# Patient Record
(demographics unavailable — no encounter records)

---

## 2024-10-15 NOTE — ASSESSMENT
[FreeTextEntry1] : 65 year old female with low back pain and lumbar radicular pain secondary to stenosis.  Given the nature of the patient's complaints and lack of significant improvement following more conservative measures, we did discuss lumbar epidural steroid injection.  Risks, benefits, and expectations of the procedure were reviewed including but not limited to bleeding, infection, and nerve damage.  The patient was provided with an educational pamphlet outlining the details of the procedure so that he/she may have the ability to review the information prior to proceeding.  The patient has agreed to proceed and will follow up with me for the procedure.

## 2024-10-15 NOTE — PHYSICAL EXAM
[Normal] : Deep tendon reflexes were 2+ and symmetric, the sensory exam was normal to light touch and pinprick and no focal deficits [de-identified] : tenderness to palpation over right PSIS [Straight-Leg Raise Test - Left] : straight leg raise of the left leg was negative [Straight-Leg Raise Test - Right] : straight leg raise  of the right leg was negative [Able to toe walk] : the patient was able to toe walk [Able to heel walk] : the patient was able to heel walk

## 2024-10-15 NOTE — DATA REVIEWED
[MRI] : MRI [FreeTextEntry1] : EXAM: [id number] - MR SPINE LUMBAR  - ORDERED BY: TEOFILO PORTER   PROCEDURE DATE:  08/12/2024    INTERPRETATION:  CLINICAL INFORMATION: Back pain  ADDITIONAL CLINICAL INFORMATION: Not Applicable  TECHNIQUE: Multiplanar, multisequence MRI was performed of the lumbar spine.  PRIOR STUDIES: Lumbar spine MRI dated 6/25/2024  FINDINGS:  BONES: Modic type I changes at the L1-2 and L2-3 level. Modic type II changes at the L2-3 and L3-4 levels. No fracture is noted. ALIGNMENT: A moderate levocurvature is noted with apex at L3. Mild right lateral listhesis of L2 on L3. Mild anterolisthesis of L4 on L5. Mild retrolisthesis of L1 on L2 and L2 on L3 and L3 on L4 SACROILIAC JOINTS/SACRUM: Mild sclerosis and spurring of both lower sacroiliac joints. CONUS AND CAUDA EQUINA: The distal cord and conus are normal in signal. Conus terminates at L2. VISUALIZED INTRAPELVIC/INTRA-ABDOMINAL SOFT TISSUES: Left renal cyst PARASPINAL SOFT TISSUES: Symmetric appearance of paraspinal musculature demonstrating mild to moderate atrophy   INDIVIDUAL LEVELS:  T10/T11: Evaluated only in the sagittal plane. Left paracentral disc protrusion mildly contacts the ventral left-sided spinal cord. Mild central canal narrowing. Moderate left foraminal narrowing. T11/T12: Evaluated only in the sagittal plane. Osseous ridging with a broad-based central disc protrusion. Mild to moderate bilateral foraminal narrowing. Mild central canal narrowing. T12/L1: Right lateral recess/foraminal disc protrusion.  Mild to moderate disc bulging. Mild facet degenerative change. Mild to moderate right lateral recess narrowing. Moderate left lateral recess narrowing. Mild bilateral foraminal narrowing. No central canal narrowing. L1-L2: Retrolisthesis. Uncovering the disc space with central disc protrusion. Posterior osseous ridging. Moderate central canal stenosis with mild contact of the distal conus. Moderate bilateral foraminal narrowing is worse on the left. L2-L3: Moderate posterior osseous ridging and disc bulging. Moderate to severe central canal narrowing. Moderate right and mild to moderate left foraminal narrowing. L3-L4: Moderate posterior osseous ridging effaces the ventral thecal sac. Moderate facet and moderate to severe ligamentous hypertrophy. Mild to moderate bilateral foraminal narrowing with contact of exiting bilateral nerve roots. Moderate central canal narrowing. L4-L5: Mild anterolisthesis uncovering the disc. Moderate to severe facet arthrosis. Moderate right and mild left foraminal narrowing. Mild bilateral lateral recess narrowing. No central canal narrowing. L5-S1: Osseous ridging. Left lateral recess disc extrusion extending posteriorly compressing the descending left S1 nerve root. Moderate to severe bilateral foraminal narrowing. No central canal narrowing.   IMPRESSION:  1.  Severe multilevel spondylosis as detailed above. 2.  Moderate to severe canal stenosis at the level of the conus including L1-2 and L2-3  --- End of Report ---       JACQUE MIRAMONTES MD; Attending Radiologist This document has been electronically signed. Aug 19 2024  9:55PM        EXAM: [id number] - MR SPINE THORACIC WAW IC  - ORDERED BY: TEOFILO PORTER   PROCEDURE DATE:  08/12/2024    INTERPRETATION:  CLINICAL INFORMATION: Palpable lesion.  COMPARISON: MRI lumbar spine 6/25/2024. Thoracic spine MRI 1/10/2007  CONTRAST: IV Contrast: Gadavist  9.5 cc administered  0.5 cc discarded Complications: None reported at time of study completion  TECHNIQUE: MR of the Thoracic Spine.  FINDINGS:  LOCALIZER: Signal abnormality is noted within the right frontal and parietal lobes.  OSSEOUS STRUCTURES: No fracture. Modic type I changes at L1-L2.  INTERVERTEBRAL DISCS: Multilevel disc desiccation and disc space narrowing.  ALIGNMENT: Slight retrolisthesis of L1 on L2, unchanged compared with the prior exam.  THORACIC CORD: Cord is of normal caliber and signal characteristics. No abnormal enhancement.  EXTRASPINAL: Partially imaged thyroid nodules are noted. Partially imaged left renal cyst. No skin markers are noted. Along the dorsal subcutaneous tissues, there is a partially imaged increased prominence of the subcutaneous fat at the level of C6-T3 with suggestion of partially imaged encapsulated fat intensity lesion within this region compared with 2007.  No nodular or mass-like enhancement is noted in the imaged portion.  INDIVIDUAL LEVELS:  T1-T2: No central canal or foraminal narrowing. Findings are similar compared with the prior exam. T2-T3: Mild disc bulge, impinging the ventral thecal sac. No canal stenosis or neural foraminal narrowing. Findings are similar compared with the prior exam. T3-T4: No central canal or foraminal narrowing. Findings are similar compared to prior exam. T4-T5: Central disc protrusion, impinging the left anterior lateral thecal sac. No canal stenosis or neural foraminal narrowing. Findings are similar compared with the prior exam. T5-T6: Left paracentral disc protrusion, impinging the left anterior lateral thecal sac. No canal stenosis or neural foraminal narrowing. Findings are similar compared with the prior exam. T6-T7: Left paracentral disc protrusion, impinging the left anterior lateral thecal sac. No canal stenosis or neural foraminal narrowing. Findings are similar compared with the prior exam. T7-T8: Right paracentral disc protrusion, impinging the right anterolateral thecal sac. No canal stenosis or neural foraminal narrowing. Findings are similar compared with the prior exam. T8-T9: Left paracentral disc protrusion, abutting the left anterior lateral cord and causing mild left neural foraminal narrowing. Findings are similar compared with the prior exam. T9-T10: Bilateral paracentral disc protrusion, impinging the right and left anterior lateral thecal sac . No neural foraminal narrowing. Findings are similar compared with the prior exam. T10-T11: Left paracentral disc protrusion, contacting the left anterior cord and causing mild left neural foraminal narrowing. Findings are similar compared with the prior exam. T11-T12: Asymmetric disc bulge to the right, effacing the right lateral ventricle CSF space. Moderate facet arthropathy. Effacing the ventral CSF space and moderate bilateral neural foraminal narrowing. Extent of canal stenosis and neural foraminal narrowing has progressed since 2007. T12-L1: Asymmetric disc bulge to the right without canal stenosis or neural foraminal narrowing. Findings are similar compared with prior exam.  L1-L2: Only evaluated on the sagittal images. Retrolisthesis. Uncovering of the disc with superimposed central disc protrusion, posterior osseous ridging, causing moderate canal stenosis and contact of the ventral conus. Severe left and moderate right neural foraminal narrowing. Findings are better visualized on concurrent lumbar spine MRI. L2-L3: Only evaluated on the sagittal images. Disc bulge and posterior osseous ridging, causing severe canal stenosis and severe right greater than left neural foraminal narrowing. Findings are better visualized on concurrent lumbar spine MRI.   IMPRESSION: 1.  No skin markers are noted. Compared with 2007, there is partially imaged increased prominence of the subcutaneous fat at the level C6-T3 with suggestion of a partially imaged encapsulated fat intensity lesion, which may represent lipomatous lesion.  No aggressive features in the imaged portion. 2.  Multilevel thoracic degenerative changes, progressed at T11-T12, where there is effacement of the ventral CSF space and moderate bilateral neural foraminal narrowing. 3.  Signal abnormality noted within the right parietal and frontal lobes, only seen on the localizer images, and may represent prior infarcts. Correlation with MRI of brain is suggested.  --- End of Report ---      RIGOBERTO KIMBALL MD; Fellow Radiology This document has been electronically signed. PASCUAL PAUL MD; Attending Radiologist This document has been electronically signed. Aug 20 2024  5:35PM

## 2024-10-15 NOTE — HISTORY OF PRESENT ILLNESS
[Back] : back [___ mths] : [unfilled] month(s) ago [8] : a maximum pain level of 8/10 [Dull] : dull [Right] : right [Anterior] : anterior aspect of the [Ankle] : ankle [Thigh] : thigh [Walking] : walking [Sitting] : sitting [Gait Dysfunction] : gait dysfunction [PT] : PT [Medications] : medications [FreeTextEntry6] : Tylenol

## 2024-11-04 NOTE — PROCEDURE
[de-identified] : [facility] [address] [place] [address], [id number] - [phone]  PATIENT: ASHLEY SORIANO  MEDICAL RECORD #: DATE OF OPERATION: 11/04/2024  PREOPERATIVE DIAGNOSIS:  LUMBAR RADICULAR PAIN POSTOPERATIVE DIAGNOSIS:  LUMBAR RADICULAR PAIN PROCEDURE: LUMBAR EPIDURAL STEROID INJECTION UNDER X-RAY GUIDANCE SURGEON:  ASHLEY BAKER M.D. ANESTHEISA:  LOCAL EBL:  MINIMAL  INDICATIONS:  The patient returns to Pain Management with persistent lower back pain with radiation down the right leg.  The pain has remained quite significant and interferes with the patients ability to perform ADLs despite the implementation of other conservative measures.  I discussed with the patient at length the risks, benefits, and expectations of the aforementioned procedure.  All of the patients questions were answered.  The patient signed informed consent and agreed to proceed.  PROCEDURE:  The patient was transported to the operating room, placed in the prone position and monitored non-invasively with stable vital signs and no complaints.  The patients back was prepped three times with betadine and draped in meticulous sterile fashion.  The L2-L3 interspace was identified fluoroscopically and the skin overlying this level was infiltrated with 5cc of 1% preservative-free lidocaine using a 25 gauge one inch needle.  The epidural space was approached and entered at this level with a 20 gauge Tuohy needle by loss of resistance technique.  Following loss of resistance to air, aspiration was negative for CSF or heme.  Then, 2cc of Omnipaque 240 were injected and the epidurogram revealed spread from L2 to L4 in the posterior epidural space bilaterally with right-sided predominance and no distinct cutoff.  Depo-Medrol 80mg was then injected with 1cc of preservative-free 1% lidocaine.  The needle tract was flushed with 2cc of 1% lidocaine and the needle was removed.  A sterile bandage was applied.  The patient tolerated the procedure well without complaint or complication.  The patient was observed in stable condition after the procedure for approximately 30 minutes before being discharged to home in the company of an adult.  The patient was provided with full written instructions.  The patient will be seen for follow-up in my office in 1 week but certainly sooner with any questions or concerns.    Ashley Baker M.D.

## 2024-11-20 NOTE — ASSESSMENT
[FreeTextEntry1] : 65 year old female with low back pain and lumbar radicular pain not improved following her first LESI.  She is agreeable to try a second injection at L3-L4.  Risks, benefits, and expectations of the procedure were reviewed including but not limited to bleeding, infection, and nerve damage.  The patient was provided with an educational pamphlet outlining the details of the procedure so that he/she may have the ability to review the information prior to proceeding.  The patient has agreed to proceed and will follow up with me for the procedure.

## 2024-11-20 NOTE — HISTORY OF PRESENT ILLNESS
[FreeTextEntry1] : Patient returns after the first LESI a few weeks ago.  The patient reports no improvement in the symptoms.  The patient returns for a follow up visit today.

## 2024-12-02 NOTE — PROCEDURE
[de-identified] : [facility] [address] [place] [address], [id number] - [phone]  PATIENT: ASHLEY SORIANO  MEDICAL RECORD #: DATE OF OPERATION: 12/02/2024  PREOPERATIVE DIAGNOSIS:  LUMBAR RADICULAR PAIN POSTOPERATIVE DIAGNOSIS:  LUMBAR RADICULAR PAIN PROCEDURE: LUMBAR EPIDURAL STEROID INJECTION UNDER X-RAY GUIDANCE SURGEON:  ASHLEY BAKER M.D. ANESTHEISA:  LOCAL EBL:  MINIMAL  INDICATIONS:  The patient returns to Pain Management with persistent lower back pain with radiation down the right leg.  The pain has remained quite significant and interferes with the patients ability to perform ADLs despite the implementation of other conservative measures.  I discussed with the patient at length the risks, benefits, and expectations of the aforementioned procedure.  All of the patients questions were answered.  The patient signed informed consent and agreed to proceed.  PROCEDURE:  The patient was transported to the operating room, placed in the prone position and monitored non-invasively with stable vital signs and no complaints.  The patients back was prepped three times with betadine and draped in meticulous sterile fashion.  The L3-L4 interspace was identified fluoroscopically and the skin overlying this level was infiltrated with 5cc of 1% preservative-free lidocaine using a 25 gauge one inch needle.  The epidural space was approached and entered at this level with a 20 gauge Tuohy needle by loss of resistance technique.  Following loss of resistance to air, aspiration was negative for CSF or heme.  Then, 2cc of Omnipaque 240 were injected and the epidurogram revealed spread from L3 to L5 in the posterior epidural space bilaterally with right-sided predominance and no distinct cutoff.  Depo-Medrol 80mg was then injected with 1cc of preservative-free 1% lidocaine.  The needle tract was flushed with 2cc of 1% lidocaine and the needle was removed.  A sterile bandage was applied.  The patient tolerated the procedure well without complaint or complication.  The patient was observed in stable condition after the procedure for approximately 30 minutes before being discharged to home in the company of an adult.  The patient was provided with full written instructions.  The patient will be seen for follow-up in my office in 1 week but certainly sooner with any questions or concerns.    Ashley Baker M.D.

## 2024-12-17 NOTE — HISTORY OF PRESENT ILLNESS
[FreeTextEntry1] : Patient returns after the second LESI a few weeks ago.  The patient reports 50% improvement in the symptoms of the lower back.  The patient still complains of pain in her right groin and right anterior thigh.  She is here to discuss.

## 2024-12-17 NOTE — ASSESSMENT
[FreeTextEntry1] : 65 year old female with low back pain and lumbar radicular pain now moderately improved.  Although she continues with right groin and anterior thigh pain, knee pain and shin pain.  She is taking medication for her breast cancer that causes bone and joint pain.  She will also begin PT for her lower back.  She did have right TKR in 2013 with Dr. Friend.  She will follow up with Dr. Ortega and Dr. Friend for right hip and knee evaluation prior to any additional epidural injection. PAST MEDICAL HISTORY:  No pertinent past medical history

## 2025-03-23 NOTE — ASSESSMENT
[FreeTextEntry1] : She is a 66 y/o F with Stage I (T1N0) invasive ductal carcinoma of the left breast that is ER/KS positive and Her2 negative s/p lumpectomy and SLNB. She had Oncotype score of 33 and underwent adjuvant chemotherapy: CMF completed 4/2022 followed by RT to the breast with Dr Pearson. She has been on endocrine therapy since 6/2022 and has arthralgias on all the AI therapies affecting QOL. We reviewed duration of therapy for up to 7 years. We reviewed consideration of tamoxifen to see if arthralgias are any better. She is willing to try tamoxifen. Reviewed risk of blood thickness less than 1%. She has history of hysterectomy. She will continue with surveillance. Reviewed supportive measures for arthralgias. Next follow up in 6 months but earlier if any new symptoms.  Bone health: last bone density 5/2024 and next will be 2026. We reviewed calcium and Vitamin D supplementation along with exercise to maintain bone health.

## 2025-03-23 NOTE — PHYSICAL EXAM
[Restricted in physically strenuous activity but ambulatory and able to carry out work of a light or sedentary nature] : Status 1- Restricted in physically strenuous activity but ambulatory and able to carry out work of a light or sedentary nature, e.g., light house work, office work [Obese] : obese [Normal] : affect appropriate [de-identified] : left lumpectomy and SLNB scar; no abnl masses or axillary LN palpable  [de-identified] : +BS. NT  [de-identified] : negative SLR, no reproducible pain

## 2025-03-23 NOTE — HISTORY OF PRESENT ILLNESS
[Disease: _____________________] : Disease: [unfilled] [T: ___] : T[unfilled] [N: ___] : N[unfilled] [AJCC Stage: ____] : AJCC Stage: [unfilled] [de-identified] : Age 63: left breast cancer Screen detected: on 9/14/2021 she had screening mammogram/ sonogram which showed lower inner left breast at an anterior middle third depth a 1.6cm spiculated mass at 7:00 and left upper outer breast 1.1 cm circumscribed mass at a middle third depth at 2:00. On 9/20/2021, she had breast biopsy which showed at 2:00 fibrocystic changes with stromal fibrosis, adenosis, sclerosing adenosis, apocrine metaplasia and microcysts, and at 7:00 invasive moderately differentiated ductal carcinoma, Carla score 6 / 9 ( 3 + 2 + 1), with invasive tumor measuring at least 0.8 cm as well as DCIS solid and cribriform pattern with intermediate grade nuclear atypia. Microcalcifications absent, lympho-vascular permeation by tumor not seen, ER 98% CO 15% HER2 IHC 0. On 10/6/2021, she had breast MRI which showed (right breast) linear branched non-mass enhancement measuring approximately 3.0 cm AP in the anterior upper inner right breast which demonstrates mixed progressive and plateau enhancement kinetics and scattered enhancing nonspecific foci and (left breast) in the lower inner left breast anterior to middle depth, an irregular enhancing mass containing a biopsy clip which measures approximately 2.3 x 2.0 x 1.5 cm, corresponding to the site of recent biopsy-proven malignancy. On 11/11/2021, she had left lumpectomy + SLNB with Dr. Diaz. The pathology showed invasive ductal carcinoma, moderately differentiated, 1.1 cm, [place] grade II with negative margins. Closest invasive carcinoma margin is less than 0.1 cm from anterior margin. Closest DCIS margin is 0.7 cm from anterior margin. DCIS, cribriform, intermediate grade without necrosis or calcification. No lympho-vascular invasion identified and 0/1 SLN with cancer. OncotypeDX score was 33 = 21 % distant risk of recurrence. She was treated by Dr Tellez and completed DD CMF / Onpro 4/19/22. She completed adjuvant XRT 6/9/22 with Dr. Tracy Pearson. She started anastrozole in 6/22. She also noted hot flashes which resolved after holding anastrozole and switched to exemestane. Developed continuity of care with  5/2024. She continues to have stiffness on therapy: feels affects QOL.    [de-identified] : invasive ductal carcinoma ER 98% PA 15% HER2 IHC 0 [de-identified] : ddCMF 4/2022  anastrozole 6/2022 to 3/2023 exemestane 3/2023 to 5/2024  letrozole 5/2024 to 3/2025 tamoxifen 3/2025 to present  [de-identified] : She continues to have stiffness and pain over the joints that is affecting her ADLs and work. Denies any new breast pain or chest wall changes. No back pain, cough or HA. She denies any new health changes.

## 2025-03-23 NOTE — REVIEW OF SYSTEMS
[Diarrhea: Grade 0] : Diarrhea: Grade 0 [Joint Pain] : joint pain [Joint Stiffness] : joint stiffness [Negative] : Allergic/Immunologic [Muscle Pain] : no muscle pain [Muscle Weakness] : no muscle weakness

## 2025-06-23 NOTE — PHYSICAL EXAM
[Fully active, able to carry on all pre-disease performance without restriction] : Status 0 - Fully active, able to carry on all pre-disease performance without restriction [Obese] : obese [Normal] : affect appropriate [de-identified] : left lumpectomy and SLNB scar; no abnl masses or axillary LN palpable  [de-identified] : mild LUQ pain; no rebound or guarding  [de-identified] : negative SLR, no reproducible pain

## 2025-06-23 NOTE — ASSESSMENT
[FreeTextEntry1] : She is a 65 y/o F with Stage I (T1N0) invasive ductal carcinoma of the left breast that is ER/IL positive and Her2 negative s/p lumpectomy and SLNB. She had Oncotype score of 33 and underwent adjuvant chemotherapy: CMF completed 4/2022 followed by RT to the breast with Dr Pearson. She has been on endocrine therapy since 6/2022 and has arthralgias on all the AI therapies and noticing arthralgias even with tamoxifen. She is willing to continue with tamoxifen. We reviewed supportive measures for joint pain. We reviewed if intolerable, could switch medications again. We explained that back pain can be referred to anterior hip and this may be related to her chronic lower back pain. Gave her options for hip orthopedics. We reviewed continued exercise as tolerated. Rx for mammogram/ sonogram for December. Questions answered to her satisfaction. She is agreeable with plan. Next follow up in 6 months but earlier if any new symptoms.  Bone health: last bone density 5/2024 and next will be 2026. We reviewed calcium and Vitamin D supplementation along with exercise to maintain bone health.

## 2025-06-23 NOTE — HISTORY OF PRESENT ILLNESS
[Disease: _____________________] : Disease: [unfilled] [T: ___] : T[unfilled] [N: ___] : N[unfilled] [AJCC Stage: ____] : AJCC Stage: [unfilled] [de-identified] : Age 63: left breast cancer Screen detected: on 9/14/2021 she had screening mammogram/ sonogram which showed lower inner left breast at an anterior middle third depth a 1.6cm spiculated mass at 7:00 and left upper outer breast 1.1 cm circumscribed mass at a middle third depth at 2:00. On 9/20/2021, she had breast biopsy which showed at 2:00 fibrocystic changes with stromal fibrosis, adenosis, sclerosing adenosis, apocrine metaplasia and microcysts, and at 7:00 invasive moderately differentiated ductal carcinoma, Carla score 6 / 9 ( 3 + 2 + 1), with invasive tumor measuring at least 0.8 cm as well as DCIS solid and cribriform pattern with intermediate grade nuclear atypia. Microcalcifications absent, lympho-vascular permeation by tumor not seen, ER 98% AR 15% HER2 IHC 0. On 10/6/2021, she had breast MRI which showed (right breast) linear branched non-mass enhancement measuring approximately 3.0 cm AP in the anterior upper inner right breast which demonstrates mixed progressive and plateau enhancement kinetics and scattered enhancing nonspecific foci and (left breast) in the lower inner left breast anterior to middle depth, an irregular enhancing mass containing a biopsy clip which measures approximately 2.3 x 2.0 x 1.5 cm, corresponding to the site of recent biopsy-proven malignancy. On 11/11/2021, she had left lumpectomy + SLNB with Dr. Diaz. The pathology showed invasive ductal carcinoma, moderately differentiated, 1.1 cm, [place] grade II with negative margins. Closest invasive carcinoma margin is less than 0.1 cm from anterior margin. Closest DCIS margin is 0.7 cm from anterior margin. DCIS, cribriform, intermediate grade without necrosis or calcification. No lympho-vascular invasion identified and 0/1 SLN with cancer. OncotypeDX score was 33 = 21 % distant risk of recurrence. She was treated by Dr Tellez and completed DD CMF / Onpro 4/19/22. She completed adjuvant XRT 6/9/22 with Dr. Tracy Pearson. She started anastrozole in 6/22. She also noted hot flashes which resolved after holding anastrozole and switched to exemestane. Developed continuity of care with  5/2024. She continues to have stiffness on therapy: feels affects QOL.    [de-identified] : invasive ductal carcinoma ER 98% WA 15% HER2 IHC 0 [de-identified] : ddCMF 4/2022  anastrozole 6/2022 to 3/2023 exemestane 3/2023 to 5/2024  letrozole 5/2024 to 3/2025 tamoxifen 3/2025 to present  [de-identified] : She is taking tamoxifen and feels the arthralgias are stable. Feels she has pain over joints that is worse with walking and feels feet are more sensitive. She has tried topical treatment and magnesium without any improvement. She is willing to continue for up to 5 years of the therapy. She had EGD done to evaluate the abdominal pain/ mass: notices pain over the LUQ. She has hip pain R sided and wondering about orthopedics. She saw Dr Landry and was referred to pain management but feels it may be hip that is bothering her. Denies any new breast pain or chest wall changes. No back pain, cough or HA.

## 2025-06-23 NOTE — REVIEW OF SYSTEMS
[Diarrhea: Grade 0] : Diarrhea: Grade 0 [Joint Pain] : joint pain [Joint Stiffness] : joint stiffness [Negative] : Allergic/Immunologic [[email or web address]] : sensitivity over the B feet

## 2025-07-15 NOTE — REASON FOR VISIT
[Initial Visit] : an initial visit for [Hip Pain] : hip pain [Artificial Knee Joint] : an artificial knee joint

## 2025-07-15 NOTE — DISCUSSION/SUMMARY
[de-identified] : This patient has extra-articular right hip pain which may be related to her known lumbar radiculopathy and normal well-functioning bilateral total knee arthroplasties.  The patient is not an appropriate candidate for surgical intervention at this time. An extensive discussion was conducted on the natural history of the disease and the variety of surgical and non-surgical options available to the patient including, but not limited to non-steroidal anti-inflammatory medications, steroid injections, physical therapy, maintenance of ideal body weight, and reduction of activity.  Referred to physiatry for workup of the lumbar spine.  Given the severe pain that she has in the groin I will obtain an MRI of the right hip to rule out subchondral fracture or labral pathology.  Meloxicam prescribed. The patient will schedule an appointment as needed.

## 2025-07-15 NOTE — PHYSICAL EXAM
[de-identified] : Patient is well nourished, well-developed, in no acute distress, with appropriate mood and affect. The patient is oriented to time, place, and person. Respirations are even and unlabored. Gait evaluation does not reveal a limp. There is no inguinal adenopathy. Examination of the contralateral hip shows normal range of motion, strength, no tenderness, and intact skin. The affected limb is well-perfused and showed 2+ dp/pt pulses, without skin lesions, shows a grossly normal motor and sensory examination. Examination of the hip shows no skin lesions. Hip motion is full and painless from 0-90 degrees extension to flexion, 20 degrees adduction and 20 degrees abduction, and 15 degrees internal and 30 degrees external rotation. Leg lengths are approximately equal. FADIR is negative and SIMON is negative. Stinchfield test is negative. Both hips are stable and muscle strength is normal with good strength with resisted abduction and adduction. Pedal pulses are palpable.  The bilateral limbs are well-perfused, has a well appearing surgical scar, and shows a grossly normal motor and sensory examination. Knee motion is painless and the bilateral knee moves from 0 to 110  degrees. The knee is stable within that range-of-motion to AP and ML stress. The alignment of the knee is neutral. Muscle strength is normal. Quadriceps and hamstring muscle strength is normal bilaterally. Pedal pulses are palpable.  [de-identified] : AP pelvis, AP and lateral hip radiographs of the right hip were ordered and taken in the office and demonstrate no evidence of degenerative joint disease of the hip with maintained joint space and no evidence of fractures or other intraarticular pathology.  AP, lateral, tunnel, and sunrise knee x-rays of the bilateral knee were ordered and obtained in the office and demonstrate satisfactory position and alignment of the components are present. No signs of loosening are seen.